# Patient Record
Sex: MALE | Race: WHITE | NOT HISPANIC OR LATINO | Employment: OTHER | URBAN - METROPOLITAN AREA
[De-identification: names, ages, dates, MRNs, and addresses within clinical notes are randomized per-mention and may not be internally consistent; named-entity substitution may affect disease eponyms.]

---

## 2022-02-28 ENCOUNTER — NEW PATIENT (OUTPATIENT)
Dept: URBAN - METROPOLITAN AREA CLINIC 36 | Facility: CLINIC | Age: 72
End: 2022-02-28

## 2022-02-28 DIAGNOSIS — B00.52: ICD-10-CM

## 2022-02-28 DIAGNOSIS — H04.123: ICD-10-CM

## 2022-02-28 PROCEDURE — 92004 COMPRE OPH EXAM NEW PT 1/>: CPT

## 2022-02-28 RX ORDER — GANCICLOVIR 1.5 MG/G: 1 GEL OPHTHALMIC

## 2022-02-28 ASSESSMENT — VISUAL ACUITY
OD_SC: 20/20
OS_PH: 20/50+2
OS_SC: 20/80

## 2022-02-28 ASSESSMENT — TONOMETRY
OS_IOP_MMHG: 14
OD_IOP_MMHG: 12

## 2022-02-28 NOTE — PATIENT DISCUSSION
Possible treatments include: warm compresses with microwavable eye mask for 10 minutes and take 3,000 mg flaxseed or fish oil by mouth daily. Recommended artificial tears to use as needed.

## 2022-03-08 ENCOUNTER — FOLLOW UP (OUTPATIENT)
Dept: URBAN - METROPOLITAN AREA CLINIC 36 | Facility: CLINIC | Age: 72
End: 2022-03-08

## 2022-03-08 DIAGNOSIS — H04.123: ICD-10-CM

## 2022-03-08 DIAGNOSIS — B00.52: ICD-10-CM

## 2022-03-08 PROCEDURE — 92012 INTRM OPH EXAM EST PATIENT: CPT

## 2022-03-08 RX ORDER — GANCICLOVIR 1.5 MG/G: 1 GEL OPHTHALMIC

## 2022-03-08 ASSESSMENT — TONOMETRY
OD_IOP_MMHG: 12
OS_IOP_MMHG: 14

## 2022-03-08 ASSESSMENT — VISUAL ACUITY
OS_PH: 20/30
OD_SC: 20/20
OS_SC: 20/40+2

## 2022-03-17 ENCOUNTER — FOLLOW UP (OUTPATIENT)
Dept: URBAN - METROPOLITAN AREA CLINIC 36 | Facility: CLINIC | Age: 72
End: 2022-03-17

## 2022-03-17 DIAGNOSIS — H04.123: ICD-10-CM

## 2022-03-17 DIAGNOSIS — B00.52: ICD-10-CM

## 2022-03-17 PROCEDURE — 92012 INTRM OPH EXAM EST PATIENT: CPT

## 2022-03-17 RX ORDER — PREDNISOLONE ACETATE 10 MG/ML: 1 SUSPENSION/ DROPS OPHTHALMIC

## 2022-03-17 ASSESSMENT — VISUAL ACUITY
OD_SC: 20/20
OS_SC: 20/40

## 2022-03-17 NOTE — PATIENT DISCUSSION
Return to acyclovir to 400 mg twice a day, and use Zirgan 3 times a day and resume prednisolone acetate 1% 3 times a day. Follow up 10-14 days.

## 2022-03-29 ENCOUNTER — FOLLOW UP (OUTPATIENT)
Dept: URBAN - METROPOLITAN AREA CLINIC 36 | Facility: CLINIC | Age: 72
End: 2022-03-29

## 2022-03-29 DIAGNOSIS — H04.123: ICD-10-CM

## 2022-03-29 DIAGNOSIS — B00.52: ICD-10-CM

## 2022-03-29 PROCEDURE — 92012 INTRM OPH EXAM EST PATIENT: CPT

## 2022-03-29 ASSESSMENT — TONOMETRY
OS_IOP_MMHG: 10
OD_IOP_MMHG: 10

## 2022-03-29 ASSESSMENT — VISUAL ACUITY
OD_SC: 20/20-1
OS_PH: 20/30
OS_SC: 20/40-2

## 2022-03-29 NOTE — PATIENT DISCUSSION
Use preservative free lubricant drops 8-10 times a day for 2 weeks, samples of Systane given. Follow up with Dr levy is scheduled in July to see, may warrant further testing.

## 2022-04-12 ENCOUNTER — EMERGENCY VISIT (OUTPATIENT)
Dept: URBAN - METROPOLITAN AREA CLINIC 36 | Facility: CLINIC | Age: 72
End: 2022-04-12

## 2022-04-12 DIAGNOSIS — H16.232: ICD-10-CM

## 2022-04-12 DIAGNOSIS — B00.52: ICD-10-CM

## 2022-04-12 PROCEDURE — 99213 OFFICE O/P EST LOW 20 MIN: CPT

## 2022-04-12 RX ORDER — GANCICLOVIR 1.5 MG/G: 1 GEL OPHTHALMIC

## 2022-04-12 ASSESSMENT — TONOMETRY
OS_IOP_MMHG: 13
OD_IOP_MMHG: 13

## 2022-04-12 ASSESSMENT — VISUAL ACUITY
OS_SC: 20/200
OD_SC: 20/20
OS_PH: 20/50-2

## 2022-04-12 NOTE — PATIENT DISCUSSION
Continue to acyclovir to 400 mg twice a day, and resume Maged Reddy, sample given pt has some left, 5 times a day for one week then 3 times a day for one week. Start prednisolone twice a day.

## 2022-04-12 NOTE — PATIENT DISCUSSION
Use preservative free lubricant drops. Follow up with Dr patient is scheduled in July to see, may warrant further testing, asked patient to move up appointment.

## 2022-04-19 ENCOUNTER — FOLLOW UP (OUTPATIENT)
Dept: URBAN - METROPOLITAN AREA CLINIC 36 | Facility: CLINIC | Age: 72
End: 2022-04-19

## 2022-04-19 DIAGNOSIS — B00.52: ICD-10-CM

## 2022-04-19 DIAGNOSIS — H16.232: ICD-10-CM

## 2022-04-19 DIAGNOSIS — H04.123: ICD-10-CM

## 2022-04-19 PROCEDURE — 92012 INTRM OPH EXAM EST PATIENT: CPT

## 2022-04-19 ASSESSMENT — TONOMETRY
OD_IOP_MMHG: 12
OS_IOP_MMHG: 16

## 2022-04-19 ASSESSMENT — VISUAL ACUITY
OD_SC: 20/20
OS_PH: 20/30-2
OS_SC: 20/40-1

## 2024-04-26 ENCOUNTER — EMERGENCY VISIT (OUTPATIENT)
Dept: URBAN - METROPOLITAN AREA CLINIC 36 | Facility: CLINIC | Age: 74
End: 2024-04-26

## 2024-04-26 DIAGNOSIS — B00.52: ICD-10-CM

## 2024-04-26 PROCEDURE — 99214 OFFICE O/P EST MOD 30 MIN: CPT

## 2024-04-26 ASSESSMENT — VISUAL ACUITY
OS_PH: 20/70
OD_SC: 20/20-2
OS_SC: 20/400

## 2024-04-26 ASSESSMENT — TONOMETRY
OD_IOP_MMHG: 16
OS_IOP_MMHG: 08